# Patient Record
Sex: FEMALE | Race: OTHER | URBAN - METROPOLITAN AREA
[De-identification: names, ages, dates, MRNs, and addresses within clinical notes are randomized per-mention and may not be internally consistent; named-entity substitution may affect disease eponyms.]

---
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
## 2023-06-02 ENCOUNTER — EMERGENCY (EMERGENCY)
Facility: HOSPITAL | Age: 18
LOS: 0 days | Discharge: HOME | End: 2023-06-02
Attending: EMERGENCY MEDICINE
Payer: MEDICAID

## 2023-06-02 VITALS
OXYGEN SATURATION: 98 % | DIASTOLIC BLOOD PRESSURE: 68 MMHG | SYSTOLIC BLOOD PRESSURE: 123 MMHG | RESPIRATION RATE: 18 BRPM | HEART RATE: 92 BPM

## 2023-06-02 VITALS
HEART RATE: 98 BPM | WEIGHT: 98.11 LBS | DIASTOLIC BLOOD PRESSURE: 62 MMHG | TEMPERATURE: 98 F | SYSTOLIC BLOOD PRESSURE: 115 MMHG | OXYGEN SATURATION: 98 % | RESPIRATION RATE: 18 BRPM

## 2023-06-02 DIAGNOSIS — R10.31 RIGHT LOWER QUADRANT PAIN: ICD-10-CM

## 2023-06-02 DIAGNOSIS — R63.0 ANOREXIA: ICD-10-CM

## 2023-06-02 DIAGNOSIS — R11.0 NAUSEA: ICD-10-CM

## 2023-06-02 DIAGNOSIS — R10.32 LEFT LOWER QUADRANT PAIN: ICD-10-CM

## 2023-06-02 LAB
ALBUMIN SERPL ELPH-MCNC: 4.5 G/DL — SIGNIFICANT CHANGE UP (ref 3.5–5.2)
ALP SERPL-CCNC: 51 U/L — SIGNIFICANT CHANGE UP (ref 30–115)
ALT FLD-CCNC: 9 U/L — LOW (ref 14–37)
ANION GAP SERPL CALC-SCNC: 11 MMOL/L — SIGNIFICANT CHANGE UP (ref 7–14)
APPEARANCE UR: CLEAR — SIGNIFICANT CHANGE UP
AST SERPL-CCNC: 17 U/L — SIGNIFICANT CHANGE UP (ref 14–37)
BASOPHILS # BLD AUTO: 0.02 K/UL — SIGNIFICANT CHANGE UP (ref 0–0.2)
BASOPHILS NFR BLD AUTO: 0.2 % — SIGNIFICANT CHANGE UP (ref 0–1)
BILIRUB SERPL-MCNC: 0.7 MG/DL — SIGNIFICANT CHANGE UP (ref 0.2–1.2)
BILIRUB UR-MCNC: NEGATIVE — SIGNIFICANT CHANGE UP
BUN SERPL-MCNC: 12 MG/DL — SIGNIFICANT CHANGE UP (ref 10–20)
CALCIUM SERPL-MCNC: 9.2 MG/DL — SIGNIFICANT CHANGE UP (ref 8.4–10.5)
CHLORIDE SERPL-SCNC: 104 MMOL/L — SIGNIFICANT CHANGE UP (ref 98–110)
CO2 SERPL-SCNC: 23 MMOL/L — SIGNIFICANT CHANGE UP (ref 17–32)
COLOR SPEC: YELLOW — SIGNIFICANT CHANGE UP
CREAT SERPL-MCNC: 0.6 MG/DL — LOW (ref 0.7–1.5)
DIFF PNL FLD: NEGATIVE — SIGNIFICANT CHANGE UP
EGFR: 133 ML/MIN/1.73M2 — SIGNIFICANT CHANGE UP
EOSINOPHIL # BLD AUTO: 0.03 K/UL — SIGNIFICANT CHANGE UP (ref 0–0.7)
EOSINOPHIL NFR BLD AUTO: 0.4 % — SIGNIFICANT CHANGE UP (ref 0–8)
GLUCOSE SERPL-MCNC: 109 MG/DL — HIGH (ref 70–99)
GLUCOSE UR QL: NEGATIVE — SIGNIFICANT CHANGE UP
HCT VFR BLD CALC: 34.1 % — LOW (ref 37–47)
HGB BLD-MCNC: 11.5 G/DL — LOW (ref 12–16)
IMM GRANULOCYTES NFR BLD AUTO: 0.2 % — SIGNIFICANT CHANGE UP (ref 0.1–0.3)
KETONES UR-MCNC: ABNORMAL
LEUKOCYTE ESTERASE UR-ACNC: NEGATIVE — SIGNIFICANT CHANGE UP
LIDOCAIN IGE QN: 25 U/L — SIGNIFICANT CHANGE UP (ref 7–60)
LYMPHOCYTES # BLD AUTO: 1.15 K/UL — LOW (ref 1.2–3.4)
LYMPHOCYTES # BLD AUTO: 13.8 % — LOW (ref 20.5–51.1)
MCHC RBC-ENTMCNC: 29.7 PG — SIGNIFICANT CHANGE UP (ref 27–31)
MCHC RBC-ENTMCNC: 33.7 G/DL — SIGNIFICANT CHANGE UP (ref 32–37)
MCV RBC AUTO: 88.1 FL — SIGNIFICANT CHANGE UP (ref 81–99)
MONOCYTES # BLD AUTO: 0.5 K/UL — SIGNIFICANT CHANGE UP (ref 0.1–0.6)
MONOCYTES NFR BLD AUTO: 6 % — SIGNIFICANT CHANGE UP (ref 1.7–9.3)
NEUTROPHILS # BLD AUTO: 6.62 K/UL — HIGH (ref 1.4–6.5)
NEUTROPHILS NFR BLD AUTO: 79.4 % — HIGH (ref 42.2–75.2)
NITRITE UR-MCNC: NEGATIVE — SIGNIFICANT CHANGE UP
NRBC # BLD: 0 /100 WBCS — SIGNIFICANT CHANGE UP (ref 0–0)
PH UR: 6 — SIGNIFICANT CHANGE UP (ref 5–8)
PLATELET # BLD AUTO: 217 K/UL — SIGNIFICANT CHANGE UP (ref 130–400)
PMV BLD: 9.5 FL — SIGNIFICANT CHANGE UP (ref 7.4–10.4)
POTASSIUM SERPL-MCNC: 4.1 MMOL/L — SIGNIFICANT CHANGE UP (ref 3.5–5)
POTASSIUM SERPL-SCNC: 4.1 MMOL/L — SIGNIFICANT CHANGE UP (ref 3.5–5)
PROT SERPL-MCNC: 7 G/DL — SIGNIFICANT CHANGE UP (ref 6–8)
PROT UR-MCNC: ABNORMAL
RBC # BLD: 3.87 M/UL — LOW (ref 4.2–5.4)
RBC # FLD: 12.6 % — SIGNIFICANT CHANGE UP (ref 11.5–14.5)
SODIUM SERPL-SCNC: 138 MMOL/L — SIGNIFICANT CHANGE UP (ref 135–146)
SP GR SPEC: 1.03 — HIGH (ref 1.01–1.03)
UROBILINOGEN FLD QL: SIGNIFICANT CHANGE UP
WBC # BLD: 8.34 K/UL — SIGNIFICANT CHANGE UP (ref 4.8–10.8)
WBC # FLD AUTO: 8.34 K/UL — SIGNIFICANT CHANGE UP (ref 4.8–10.8)

## 2023-06-02 PROCEDURE — 36415 COLL VENOUS BLD VENIPUNCTURE: CPT

## 2023-06-02 PROCEDURE — 93976 VASCULAR STUDY: CPT | Mod: 26,59

## 2023-06-02 PROCEDURE — 87086 URINE CULTURE/COLONY COUNT: CPT

## 2023-06-02 PROCEDURE — 81001 URINALYSIS AUTO W/SCOPE: CPT

## 2023-06-02 PROCEDURE — 99284 EMERGENCY DEPT VISIT MOD MDM: CPT

## 2023-06-02 PROCEDURE — 76856 US EXAM PELVIC COMPLETE: CPT | Mod: 26

## 2023-06-02 PROCEDURE — 74177 CT ABD & PELVIS W/CONTRAST: CPT | Mod: MA

## 2023-06-02 PROCEDURE — 76830 TRANSVAGINAL US NON-OB: CPT | Mod: 26

## 2023-06-02 PROCEDURE — 85025 COMPLETE CBC W/AUTO DIFF WBC: CPT

## 2023-06-02 PROCEDURE — 99284 EMERGENCY DEPT VISIT MOD MDM: CPT | Mod: 25

## 2023-06-02 PROCEDURE — 76830 TRANSVAGINAL US NON-OB: CPT

## 2023-06-02 PROCEDURE — 74177 CT ABD & PELVIS W/CONTRAST: CPT | Mod: 26,MA

## 2023-06-02 PROCEDURE — 83690 ASSAY OF LIPASE: CPT

## 2023-06-02 PROCEDURE — 80053 COMPREHEN METABOLIC PANEL: CPT

## 2023-06-02 RX ORDER — ACETAMINOPHEN 500 MG
650 TABLET ORAL ONCE
Refills: 0 | Status: COMPLETED | OUTPATIENT
Start: 2023-06-02 | End: 2023-06-02

## 2023-06-02 RX ORDER — DIATRIZOATE MEGLUMINE 180 MG/ML
30 INJECTION, SOLUTION INTRAVESICAL ONCE
Refills: 0 | Status: COMPLETED | OUTPATIENT
Start: 2023-06-02 | End: 2023-06-02

## 2023-06-02 RX ORDER — SODIUM CHLORIDE 9 MG/ML
1000 INJECTION, SOLUTION INTRAVENOUS ONCE
Refills: 0 | Status: COMPLETED | OUTPATIENT
Start: 2023-06-02 | End: 2023-06-02

## 2023-06-02 RX ADMIN — SODIUM CHLORIDE 1000 MILLILITER(S): 9 INJECTION, SOLUTION INTRAVENOUS at 13:07

## 2023-06-02 RX ADMIN — DIATRIZOATE MEGLUMINE 30 MILLILITER(S): 180 INJECTION, SOLUTION INTRAVESICAL at 13:07

## 2023-06-02 RX ADMIN — Medication 650 MILLIGRAM(S): at 21:22

## 2023-06-02 NOTE — ED PROVIDER NOTE - PHYSICAL EXAMINATION
As Follows:  CONST: Well appearing in NAD  HEENT: PERRL, EOMI, Sclera and conjunctiva clear.   CARD: No murmurs, rubs, or gallops; Normal rate and rhythm  RESP: BS Equal B/L, No wheezes, rhonchi or rales. No distress or accessory breathing  GI: Lower abdominal tenderness. Rebound tenderness over LLQ. Soft, non-distended.  SKIN: Warm, dry, no acute rashes. MMM  NEURO: A&Ox3, No focal deficits. Strength and sensation intact. Steady gait As Follows:  CONST: Well appearing in NAD  HEENT: PERRL, EOMI, Sclera and conjunctiva clear.   CARD: No murmurs, rubs, or gallops; Normal rate and rhythm  RESP: BS Equal B/L, No wheezes, rhonchi or rales. No distress or accessory breathing  GI: Lower abdominal tenderness. Rebound tenderness over LLQ. Soft, non-distended.  : Right adnexal tenderness. No left adnexal tenderness. No Cervical Motion Tenderness. No foul odor, discharge, or discoloration of cervix.  exam performed with BRIT Arriola.   SKIN: Warm, dry, no acute rashes. MMM  NEURO: A&Ox3, No focal deficits. Strength and sensation intact. Steady gait

## 2023-06-02 NOTE — ED PROVIDER NOTE - OBJECTIVE STATEMENT
Pt is a 18 y/o female with no pmhx presenting for intermittently worsening lower abdominal pain x 3 days and nausea without vomiting x 5 days. She states she has had decreased appetite s/c to nausea but is able to tolerate PO. She wanted to make appt to see Pediatrician when pain began Wednesday and went in to see them Friday. Pt is a 16 y/o female with no pmhx presenting for intermittently worsening lower abdominal pain x 3 days and nausea without vomiting x 5 days. She states she has had decreased appetite s/c to nausea but is able to tolerate PO. She wanted to make appt to see Pediatrician when pain began Wednesday and went in to see them Friday. They referred her to ED. Here she admits to continued but manageable lower abdominal pain. She denies fever, chills, cough, sore throat, current N/V/D/C, urinary burning/frequency/urgency, or hematuria.   She states she is comfortable and would not like meds at this time. Pt is a 16 y/o female with no pmhx presenting for intermittently worsening lower abdominal pain x 3 days and nausea without vomiting x 5 days. She states she has had decreased appetite s/c to nausea but is able to tolerate PO. She wanted to make appt to see Pediatrician when pain began Wednesday and went in to see them Friday. They referred her to ED. Here she admits to continued but manageable lower abdominal pain. She denies fever, chills, cough, sore throat, current N/V/D/C, urinary burning/frequency/urgency, or hematuria.   She states she is comfortable and would not like meds at this time. LMP in first week of May. She states she has irregular periods.

## 2023-06-02 NOTE — ED PEDIATRIC NURSE NOTE - NS ED NURSE LEVEL OF CONSCIOUSNESS SPEECH
Speaking Coherently Ear Wedge Repair Text: A wedge excision was completed by carrying down an excision through the full thickness of the ear and cartilage with an inward facing Burow's triangle. The wound was then closed in a layered fashion.

## 2023-06-02 NOTE — ED PEDIATRIC NURSE NOTE - OBJECTIVE STATEMENT
Pt reports to ed for lower abd pain & nausea x1wk. Pt sexually active. Sent by PMD for r.o appendicitis.

## 2023-06-02 NOTE — ED PROVIDER NOTE - ATTENDING APP SHARED VISIT CONTRIBUTION OF CARE
06/07/19 1102 Wound Elbow Left Date First Assessed/Time First Assessed: 01/15/19 1418   POA: Yes  Wound Type: Abcess; Other  Location: Elbow  Wound Description (Optional): open with drainage  Orientation: Left Dressing Status  Intact; Old drainage Dressing Type  Absorptive Non-Pressure Injury Full thickness (subcut/muscle) Wound Length (cm) 0.2 cm Wound Width (cm) 0.2 cm Wound Depth (cm) 0.5 Wound Surface area (cm^2) 0.04 cm^2 Change in Wound Size % 33.33 Condition of Edges Rolled/curled Tissue Type Pink Tissue Type Percent Other (comment) (maceration) Direction of Tunnel 12 o'clock Depth of Tunnel/Sinus (cm) 1.6 cm Depth of Undermining (cm) 0.5 Drainage Amount  Scant Drainage Color Serous; Tan  
Wound Odor None Periwound Skin Condition Macerated Cleansing and Cleansing Agents (foam wash, vashe and barrier wipes) Dressing Type Applied  
(evelin, bacitracin, mepilex border) Procedure Tolerated Well
17-year-old female with no significant past medical history presents with 2 days of worsening lower abdominal discomfort bilateral lower quadrants, associate with nausea.  No fever or urinary symptoms.  Sexually active.  No vaginal bleeding or discharge.  On exam nontoxic, vital signs noted, abdomen soft, nondistended, bilateral lower quadrant and suprapubic tenderness with no peritonitis on my exam.  Sent in by PMD to rule out appendicitis.  Pelvic exam with right adnexal tenderness per PA so if negative consider GYN pathology.  Labs overall reassuring.  Disposition pending imaging and reassessment

## 2023-06-02 NOTE — ED PROVIDER NOTE - NSFOLLOWUPINSTRUCTIONS_ED_ALL_ED_FT
Abdominal Pain    Many things can cause abdominal pain. Usually, abdominal pain is not caused by a disease and will improve without treatment. Your health care provider will do a physical exam and possibly order blood tests and imaging to help determine the seriousness of your pain. However, in many cases, no cause may be found and you may need further testing as an outpatient. Monitor your abdominal pain for any changes.     SEEK IMMEDIATE MEDICAL CARE IF YOU HAVE THE FOLLOWING SYMPTOMS: worsening abdominal pain, vomiting, diarrhea, inability to have bowel movements or pass gas, black or bloody stool, fever accompanying chest pain or back pain, or dizziness/lightheadedness. Our Emergency Department Referral Coordinators will be reaching out to you in the next 24-48 hours from 9:00am to 5:00pm with a follow up appointment. Please expect a phone call from the hospital in that time frame. If you do not receive a call or if you have any questions or concerns, you can reach them at   [phone]      Abdominal Pain    Many things can cause abdominal pain. Usually, abdominal pain is not caused by a disease and will improve without treatment. Your health care provider will do a physical exam and possibly order blood tests and imaging to help determine the seriousness of your pain. However, in many cases, no cause may be found and you may need further testing as an outpatient. Monitor your abdominal pain for any changes.     SEEK IMMEDIATE MEDICAL CARE IF YOU HAVE THE FOLLOWING SYMPTOMS: worsening abdominal pain, vomiting, diarrhea, inability to have bowel movements or pass gas, black or bloody stool, fever accompanying chest pain or back pain, or dizziness/lightheadedness.

## 2023-06-02 NOTE — CONSULT NOTE ADULT - SUBJECTIVE AND OBJECTIVE BOX
OBGYN PGY1    Chief Complaint: abdominal pain    HPI:     Location -  Severity -  Quality -  Duration -  Timing -   Modifying Factors -   Associated Signs/Symptoms -     Ob/Gyn History:  G P                 LMP -                   Cycle Length -   Denies history of ovarian cysts, uterine fibroids, abnormal paps, or STIs  Last Pap Smear -   Last Mammogram -   Last Colonoscopy -        Denies the following: constitutional symptoms, visual symptoms, cardiovascular symptoms, respiratory symptoms, GI symptoms, musculoskeletal symptoms, skin symptoms, neurologic symptoms, hematologic symptoms, allergic symptoms, psychiatric symptoms  Except any pertinent positives listed.     Home Medications:      Allergies    No Known Allergies    Intolerances        PAST MEDICAL & SURGICAL HISTORY:  No pertinent past medical history      No significant past surgical history          FAMILY HISTORY:  No pertinent family history in first degree relatives        SOCIAL HISTORY: Denies cigarette use, alcohol use, or illicit drug use    Vital Signs Last 24 Hrs  T(F): 98 (2023 12:19), Max: 98 (2023 12:19)  HR: 98 (2023 12:19) (98 - 98)  BP: 115/62 (2023 12:19) (115/62 - 115/62)  RR: 18 (2023 12:19) (18 - 18)    General Appearance - AAOx3, NAD  Heart - S1S2 regular rate and rhythm  Lung - CTA Bilaterally  Abdomen - Soft, nontender, nondistended, no rebound, no rigidity, no guarding, bowel sounds present    GYN/Pelvis:  External genitalia:  Vagina:  Cervix:  Uterus:  Adnexa:    LABS:                        11.5   8.34  )-----------( 217      ( 2023 13:28 )             34.1         06-02    138  |  104  |  12  ----------------------------<  109<H>  4.1   |  23  |  0.6<L>    Ca    9.2      2023 13:28    TPro  7.0  /  Alb  4.5  /  TBili  0.7  /  DBili  x   /  AST  17  /  ALT  9<L>  /  AlkPhos  51  06-02      Urinalysis Basic - ( 2023 13:15 )    Color: Yellow / Appearance: Clear / S.034 / pH: x  Gluc: x / Ketone: Small  / Bili: Negative / Urobili: <2 mg/dL   Blood: x / Protein: 100 mg/dL / Nitrite: Negative   Leuk Esterase: Negative / RBC: 1 /HPF / WBC 2 /HPF   Sq Epi: x / Non Sq Epi: x / Bacteria: Negative          RADIOLOGY & ADDITIONAL STUDIES:   OBGYN PGY1    Chief Complaint: abdominal pain    HPI: 16yo G0, LMP 23, with no PMHx, presents to the ED with abdominal pain that started Wednesday night during intercourse. Initially the pain was 10/10, but is now 3/10. She denies nausea, vomiting, pelvic pain, vaginal bleeding, vaginal discharge. Takes OCPs for contraception.    Ob/Gyn History:                   LMP -  23                 Cycle Length - regular  GynHx: Denies history of ovarian cysts, uterine fibroids, abnormal paps, or STIs    Denies the following: constitutional symptoms, visual symptoms, cardiovascular symptoms, respiratory symptoms, GI symptoms, musculoskeletal symptoms, skin symptoms, neurologic symptoms, hematologic symptoms, allergic symptoms, psychiatric symptoms  Except any pertinent positives listed.     Home Medications:  OCPs    No Known Allergies    PAST MEDICAL & SURGICAL HISTORY:  No pertinent past medical history  No significant past surgical history    FAMILY HISTORY:  No pertinent family history in first degree relatives    SOCIAL HISTORY: Denies cigarette use, alcohol use, or illicit drug use    Vital Signs Last 24 Hrs  T(F): 98 (2023 12:19), Max: 98 (2023 12:19)  HR: 98 (2023 12:19) (98 - 98)  BP: 115/62 (2023 12:19) (115/62 - 115/62)  RR: 18 (2023 12:19) (18 - 18)    General Appearance - AAOx3, NAD  Heart - S1S2 regular rate and rhythm  Lung - CTA Bilaterally  Abdomen - Soft, slightly tender diffusely, nondistended, no rebound, no rigidity, no guarding, bowel sounds present    GYN/Pelvis:  External genitalia: normal external genitalia  Vagina: normal vaginal mucosa, no lacerations, no blood, no discharge  Cervix: normal cervix, no cervical motion tenderness  Uterus: no fundal tenderness  Adnexa: no masses, fullness, tenderness palpated bilaterally    LABS:                        11.5   8.34  )-----------( 217      ( 2023 13:28 )             34.1     06-02    138  |  104  |  12  ----------------------------<  109<H>  4.1   |  23  |  0.6<L>    Ca    9.2      2023 13:28    TPro  7.0  /  Alb  4.5  /  TBili  0.7  /  DBili  x   /  AST  17  /  ALT  9<L>  /  AlkPhos  51  06-02    Urinalysis Basic - ( 2023 13:15 )  Color: Yellow / Appearance: Clear / S.034 / pH: x  Gluc: x / Ketone: Small  / Bili: Negative / Urobili: <2 mg/dL   Blood: x / Protein: 100 mg/dL / Nitrite: Negative   Leuk Esterase: Negative / RBC: 1 /HPF / WBC 2 /HPF   Sq Epi: x / Non Sq Epi: x / Bacteria: Negative    Upreg neg    RADIOLOGY & ADDITIONAL STUDIES:  < from: US Transvaginal (23 @ 18:56) >    ACC: [id number] EXAM:  US TRANSVAGINAL   ORDERED BY: ALICIA KLEIN     PROCEDURE DATE:  2023          INTERPRETATION:  CLINICAL INFORMATION: Pain. Irregular periods.    LMP: 2023    COMPARISON: None available.    TECHNIQUE:  Endovaginal and transabdominal pelvic sonogram. Color and Spectral   Doppler was performed.    FINDINGS:  Uterus: 7.0 x 4.5 x 5.1 cm. Nabothian cysts.  Endometrium: 1.1 cm in thickness. Within normal limits.    Right ovary: 3.9 x 1.9 x 3.4 cm. Probable collapsed cystwithin the   central right ovary. Vascular flow is demonstrated to the right ovary.  Left ovary: 2.7 x 1.2 x 2.4 cm. Within normal limits. Vascular flow is   demonstrated to the left ovary    Fluid: Complex pelvic free fluid is present.    IMPRESSION:    1. Probable collapsed cyst in the right ovary.  2. Complex pelvic free fluid, may be related to ruptured hemorrhagic cyst.  3. No definite sonographic follow-up to the CT identified findings. Close   clinical and imaging follow-up recommended if patient's symptoms persist.    --- End of Report ---    GÉNESIS KASPER MD; Resident Radiologist  This document has been electronically signed.  LYNNETTE GOLDSTEIN MD; Attending Radiologist  This document has been electronically signed. 2023  9:15PM    < end of copied text >

## 2023-06-02 NOTE — ED PROVIDER NOTE - CARE PROVIDER_API CALL
Leonard Mullins  Pediatrics  [facility] [place] [address]  Phone: [phone]  Fax: [phone]  Follow Up Time:

## 2023-06-02 NOTE — ED PROVIDER NOTE - PATIENT PORTAL LINK FT
You can access the FollowMyHealth Patient Portal offered by [facility] by registering at the following website: [email or web address]. By joining [facility]’s FollowMyHealth portal, you will also be able to view your health information using other applications (apps) compatible with our system.

## 2023-06-02 NOTE — ED PROVIDER NOTE - CLINICAL SUMMARY MEDICAL DECISION MAKING FREE TEXT BOX
patient with ruptured ovarian cyst. lab work nml. gyn consulted given free fluid, stable to be d/c as outpatient. pain improved on repeat abd exam. Patient to be discharged from ED. Any available test results were discussed with patient and/or family. Verbal instructions given, including instructions to return to ED immediately for any new, worsening, or concerning symptoms. Patient endorsed understanding. Written discharge instructions additionally given, including follow-up plan.

## 2023-06-04 LAB
CULTURE RESULTS: NO GROWTH — SIGNIFICANT CHANGE UP
SPECIMEN SOURCE: SIGNIFICANT CHANGE UP

## 2023-06-06 PROBLEM — Z78.9 OTHER SPECIFIED HEALTH STATUS: Chronic | Status: ACTIVE | Noted: 2023-06-02

## 2023-07-07 ENCOUNTER — APPOINTMENT (OUTPATIENT)
Dept: OBGYN | Facility: CLINIC | Age: 18
End: 2023-07-07
Payer: MEDICAID

## 2023-07-07 ENCOUNTER — OUTPATIENT (OUTPATIENT)
Dept: OUTPATIENT SERVICES | Facility: HOSPITAL | Age: 18
LOS: 1 days | End: 2023-07-07
Payer: MEDICAID

## 2023-07-07 VITALS
DIASTOLIC BLOOD PRESSURE: 56 MMHG | BODY MASS INDEX: 18.85 KG/M2 | WEIGHT: 96 LBS | SYSTOLIC BLOOD PRESSURE: 98 MMHG | HEIGHT: 60 IN

## 2023-07-07 DIAGNOSIS — Z00.00 ENCOUNTER FOR GENERAL ADULT MEDICAL EXAMINATION WITHOUT ABNORMAL FINDINGS: ICD-10-CM

## 2023-07-07 DIAGNOSIS — Z01.419 ENCOUNTER FOR GYNECOLOGICAL EXAMINATION (GENERAL) (ROUTINE) W/OUT ABNORMAL FINDINGS: ICD-10-CM

## 2023-07-07 PROCEDURE — 87491 CHLMYD TRACH DNA AMP PROBE: CPT

## 2023-07-07 PROCEDURE — 87661 TRICHOMONAS VAGINALIS AMPLIF: CPT

## 2023-07-07 PROCEDURE — 99395 PREV VISIT EST AGE 18-39: CPT

## 2023-07-07 PROCEDURE — 99385 PREV VISIT NEW AGE 18-39: CPT

## 2023-07-07 PROCEDURE — 87591 N.GONORRHOEAE DNA AMP PROB: CPT

## 2023-07-07 NOTE — HISTORY OF PRESENT ILLNESS
[FreeTextEntry1] : 19yo G0 LMP starting today for annual exam and follow up for ovarian cyst from ER. \par Ultrasound reviewed with patient, shows hemorrhagic cyst, otherwise normal, pain now resolved\par Started taking Junel 2 months ago, Junel Fe 1/20 no side effects, using this for contraception and to regulate menses[facility]par \par Menses irregular, last 14 days, started 10 yo.\par PMH neg\par PSH neg\par NKDA\par no meds\par FH denies\par

## 2023-07-07 NOTE — DISCUSSION/SUMMARY
[FreeTextEntry1] : 19yo G0 annual exam\par -discussed ovarian cysts, reviewed ultrasound, normal findings, no need for follow up\par -std screening sent\par -continue contraception\par -precautions discussed, f/u in 1 yr or sooner PRN

## 2023-07-11 LAB
C TRACH RRNA SPEC QL NAA+PROBE: NOT DETECTED
N GONORRHOEA RRNA SPEC QL NAA+PROBE: NOT DETECTED
SOURCE AMPLIFICATION: NORMAL
SOURCE AMPLIFICATION: NORMAL
T VAGINALIS RRNA SPEC QL NAA+PROBE: NOT DETECTED

## 2023-07-12 DIAGNOSIS — Z01.419 ENCOUNTER FOR GYNECOLOGICAL EXAMINATION (GENERAL) (ROUTINE) WITHOUT ABNORMAL FINDINGS: ICD-10-CM

## 2023-07-21 ENCOUNTER — TRANSCRIPTION ENCOUNTER (OUTPATIENT)
Age: 18
End: 2023-07-21

## 2023-10-11 ENCOUNTER — OUTPATIENT (OUTPATIENT)
Dept: OUTPATIENT SERVICES | Facility: HOSPITAL | Age: 18
LOS: 1 days | End: 2023-10-11
Payer: COMMERCIAL

## 2023-10-11 DIAGNOSIS — K01.1 IMPACTED TEETH: ICD-10-CM

## 2023-10-11 PROCEDURE — D7140: CPT

## 2023-10-16 ENCOUNTER — OUTPATIENT (OUTPATIENT)
Dept: OUTPATIENT SERVICES | Facility: HOSPITAL | Age: 18
LOS: 1 days | End: 2023-10-16
Payer: COMMERCIAL

## 2023-10-16 DIAGNOSIS — K01.1 IMPACTED TEETH: ICD-10-CM

## 2023-10-16 DIAGNOSIS — K08.409 PARTIAL LOSS OF TEETH, UNSPECIFIED CAUSE, UNSPECIFIED CLASS: ICD-10-CM

## 2023-10-16 PROCEDURE — D9310: CPT

## 2023-10-20 DIAGNOSIS — K02.9 DENTAL CARIES, UNSPECIFIED: ICD-10-CM

## 2023-10-23 ENCOUNTER — OUTPATIENT (OUTPATIENT)
Dept: OUTPATIENT SERVICES | Facility: HOSPITAL | Age: 18
LOS: 1 days | End: 2023-10-23
Payer: COMMERCIAL

## 2023-10-23 DIAGNOSIS — K02.9 DENTAL CARIES, UNSPECIFIED: ICD-10-CM

## 2023-10-23 PROCEDURE — D0220: CPT

## 2023-10-23 PROCEDURE — D2391: CPT

## 2024-03-27 ENCOUNTER — OUTPATIENT (OUTPATIENT)
Dept: OUTPATIENT SERVICES | Facility: HOSPITAL | Age: 19
LOS: 1 days | End: 2024-03-27
Payer: COMMERCIAL

## 2024-03-27 DIAGNOSIS — K01.1 IMPACTED TEETH: ICD-10-CM

## 2024-03-27 PROCEDURE — D7140: CPT

## 2024-03-28 DIAGNOSIS — K01.0 EMBEDDED TEETH: ICD-10-CM

## 2024-07-19 ENCOUNTER — APPOINTMENT (OUTPATIENT)
Dept: OBGYN | Facility: CLINIC | Age: 19
End: 2024-07-19
Payer: MEDICAID

## 2024-07-19 ENCOUNTER — OUTPATIENT (OUTPATIENT)
Dept: OUTPATIENT SERVICES | Facility: HOSPITAL | Age: 19
LOS: 1 days | End: 2024-07-19
Payer: MEDICAID

## 2024-07-19 VITALS
HEIGHT: 60 IN | SYSTOLIC BLOOD PRESSURE: 95 MMHG | DIASTOLIC BLOOD PRESSURE: 60 MMHG | WEIGHT: 95.31 LBS | BODY MASS INDEX: 18.71 KG/M2

## 2024-07-19 DIAGNOSIS — Z01.419 ENCOUNTER FOR GYNECOLOGICAL EXAMINATION (GENERAL) (ROUTINE) WITHOUT ABNORMAL FINDINGS: ICD-10-CM

## 2024-07-19 DIAGNOSIS — Z01.419 ENCOUNTER FOR GYNECOLOGICAL EXAMINATION (GENERAL) (ROUTINE) W/OUT ABNORMAL FINDINGS: ICD-10-CM

## 2024-07-19 PROCEDURE — 87624 HPV HI-RISK TYP POOLED RSLT: CPT

## 2024-07-19 PROCEDURE — 87591 N.GONORRHOEAE DNA AMP PROB: CPT

## 2024-07-19 PROCEDURE — 87661 TRICHOMONAS VAGINALIS AMPLIF: CPT

## 2024-07-19 PROCEDURE — 87491 CHLMYD TRACH DNA AMP PROBE: CPT

## 2024-07-19 PROCEDURE — 99395 PREV VISIT EST AGE 18-39: CPT

## 2024-07-19 PROCEDURE — 99459 PELVIC EXAMINATION: CPT

## 2024-07-19 PROCEDURE — 88142 CYTOPATH C/V THIN LAYER: CPT

## 2024-07-23 DIAGNOSIS — Z01.419 ENCOUNTER FOR GYNECOLOGICAL EXAMINATION (GENERAL) (ROUTINE) WITHOUT ABNORMAL FINDINGS: ICD-10-CM

## 2024-07-23 LAB
C TRACH RRNA SPEC QL NAA+PROBE: NOT DETECTED
HPV HIGH+LOW RISK DNA PNL CVX: NOT DETECTED
N GONORRHOEA RRNA SPEC QL NAA+PROBE: NOT DETECTED
SOURCE AMPLIFICATION: NORMAL
SOURCE AMPLIFICATION: NORMAL
T VAGINALIS RRNA SPEC QL NAA+PROBE: NOT DETECTED

## 2024-07-25 LAB — CYTOLOGY CVX/VAG DOC THIN PREP: NORMAL

## 2024-11-21 ENCOUNTER — OUTPATIENT (OUTPATIENT)
Dept: OUTPATIENT SERVICES | Facility: HOSPITAL | Age: 19
LOS: 1 days | End: 2024-11-21
Payer: COMMERCIAL

## 2024-11-21 DIAGNOSIS — Z01.20 ENCOUNTER FOR DENTAL EXAMINATION AND CLEANING WITHOUT ABNORMAL FINDINGS: ICD-10-CM

## 2024-11-21 PROCEDURE — D1110: CPT

## 2024-11-21 PROCEDURE — D0230: CPT

## 2024-11-21 PROCEDURE — D0274: CPT

## 2024-11-21 PROCEDURE — D0120: CPT

## 2024-11-29 DIAGNOSIS — Z01.21 ENCOUNTER FOR DENTAL EXAMINATION AND CLEANING WITH ABNORMAL FINDINGS: ICD-10-CM

## 2025-07-04 ENCOUNTER — NON-APPOINTMENT (OUTPATIENT)
Age: 20
End: 2025-07-04